# Patient Record
Sex: MALE | Race: OTHER
[De-identification: names, ages, dates, MRNs, and addresses within clinical notes are randomized per-mention and may not be internally consistent; named-entity substitution may affect disease eponyms.]

---

## 2020-06-09 ENCOUNTER — HOSPITAL ENCOUNTER (INPATIENT)
Dept: HOSPITAL 72 - EMR | Age: 60
LOS: 1 days | Discharge: HOME | DRG: 760 | End: 2020-06-10
Payer: COMMERCIAL

## 2020-06-09 VITALS — DIASTOLIC BLOOD PRESSURE: 92 MMHG | SYSTOLIC BLOOD PRESSURE: 140 MMHG

## 2020-06-09 VITALS — SYSTOLIC BLOOD PRESSURE: 140 MMHG | DIASTOLIC BLOOD PRESSURE: 92 MMHG

## 2020-06-09 VITALS — WEIGHT: 121 LBS | BODY MASS INDEX: 18.99 KG/M2 | HEIGHT: 67 IN

## 2020-06-09 VITALS — DIASTOLIC BLOOD PRESSURE: 85 MMHG | SYSTOLIC BLOOD PRESSURE: 130 MMHG

## 2020-06-09 DIAGNOSIS — G89.29: ICD-10-CM

## 2020-06-09 DIAGNOSIS — Z88.1: ICD-10-CM

## 2020-06-09 DIAGNOSIS — F32.9: ICD-10-CM

## 2020-06-09 DIAGNOSIS — G93.40: ICD-10-CM

## 2020-06-09 DIAGNOSIS — F19.11: ICD-10-CM

## 2020-06-09 DIAGNOSIS — F10.11: ICD-10-CM

## 2020-06-09 DIAGNOSIS — R45.1: Primary | ICD-10-CM

## 2020-06-09 DIAGNOSIS — M54.9: ICD-10-CM

## 2020-06-09 DIAGNOSIS — R41.0: ICD-10-CM

## 2020-06-09 LAB
ADD MANUAL DIFF: NO
ALBUMIN SERPL-MCNC: 3.8 G/DL (ref 3.4–5)
ALBUMIN/GLOB SERPL: 0.9 {RATIO} (ref 1–2.7)
ALP SERPL-CCNC: 126 U/L (ref 46–116)
ALT SERPL-CCNC: 20 U/L (ref 12–78)
ANION GAP SERPL CALC-SCNC: 12 MMOL/L (ref 5–15)
APPEARANCE UR: CLEAR
APTT PPP: YELLOW S
AST SERPL-CCNC: 23 U/L (ref 15–37)
BASOPHILS NFR BLD AUTO: 2.6 % (ref 0–2)
BILIRUB SERPL-MCNC: 0.3 MG/DL (ref 0.2–1)
BUN SERPL-MCNC: 19 MG/DL (ref 7–18)
CALCIUM SERPL-MCNC: 9.1 MG/DL (ref 8.5–10.1)
CHLORIDE SERPL-SCNC: 107 MMOL/L (ref 98–107)
CO2 SERPL-SCNC: 24 MMOL/L (ref 21–32)
CREAT SERPL-MCNC: 1.2 MG/DL (ref 0.55–1.3)
EOSINOPHIL NFR BLD AUTO: 2.9 % (ref 0–3)
ERYTHROCYTE [DISTWIDTH] IN BLOOD BY AUTOMATED COUNT: 13.3 % (ref 11.6–14.8)
GLOBULIN SER-MCNC: 4.1 G/DL
GLUCOSE UR STRIP-MCNC: NEGATIVE MG/DL
HCT VFR BLD CALC: 42.1 % (ref 42–52)
HGB BLD-MCNC: 13 G/DL (ref 14.2–18)
KETONES UR QL STRIP: NEGATIVE
LEUKOCYTE ESTERASE UR QL STRIP: NEGATIVE
LYMPHOCYTES NFR BLD AUTO: 36.2 % (ref 20–45)
MCV RBC AUTO: 94 FL (ref 80–99)
MONOCYTES NFR BLD AUTO: 9.9 % (ref 1–10)
NEUTROPHILS NFR BLD AUTO: 48.4 % (ref 45–75)
NITRITE UR QL STRIP: NEGATIVE
PH UR STRIP: 5 [PH] (ref 4.5–8)
PLATELET # BLD: 180 K/UL (ref 150–450)
POTASSIUM SERPL-SCNC: 4.1 MMOL/L (ref 3.5–5.1)
PROT UR QL STRIP: (no result)
RBC # BLD AUTO: 4.46 M/UL (ref 4.7–6.1)
SODIUM SERPL-SCNC: 143 MMOL/L (ref 136–145)
SP GR UR STRIP: 1.02 (ref 1–1.03)
UROBILINOGEN UR-MCNC: NORMAL MG/DL (ref 0–1)
WBC # BLD AUTO: 7.6 K/UL (ref 4.8–10.8)

## 2020-06-09 PROCEDURE — 81003 URINALYSIS AUTO W/O SCOPE: CPT

## 2020-06-09 PROCEDURE — 71045 X-RAY EXAM CHEST 1 VIEW: CPT

## 2020-06-09 PROCEDURE — 85025 COMPLETE CBC W/AUTO DIFF WBC: CPT

## 2020-06-09 PROCEDURE — 84484 ASSAY OF TROPONIN QUANT: CPT

## 2020-06-09 PROCEDURE — 36415 COLL VENOUS BLD VENIPUNCTURE: CPT

## 2020-06-09 PROCEDURE — 80053 COMPREHEN METABOLIC PANEL: CPT

## 2020-06-09 PROCEDURE — 80048 BASIC METABOLIC PNL TOTAL CA: CPT

## 2020-06-09 PROCEDURE — 99285 EMERGENCY DEPT VISIT HI MDM: CPT

## 2020-06-09 PROCEDURE — 80307 DRUG TEST PRSMV CHEM ANLYZR: CPT

## 2020-06-09 NOTE — DIAGNOSTIC IMAGING REPORT
Indication: Chest pain

 

Technique: One view of the chest

 

Comparison: none

 

Findings: Lungs and pleural spaces are clear. The heart size is normal.

 

Impression: No acute process

## 2020-06-09 NOTE — NUR
ED Nurse Note:



Spoke with Mary at [facility], states she will fax pt paperwork.

## 2020-06-09 NOTE — NUR
ED Nurse Note:



Report received from LESLIE Francois. Pt is resting bed, NAD. Safety precuations in 
place, RN bedside.

## 2020-06-09 NOTE — NUR
ED Nurse Note:



Pt is stable for transfer to MS unit at this time per ERMD. Pt is aaox4, no 
respiratory distress and is ambulatory with steady gait. Pt vitals are stable. 
Pt taken to unit via wheelchair by RN. Pt belongings taken out of locker and 
transferred up to unit with pt. Pt IV is patent and intact. Physical copy of 
5150 hold handed to charge RN on unit.
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
## 2020-06-09 NOTE — NUR
ED Nurse Note:



Pt began yelling inappropriate language on the unit and is anxious at this 
time. Pt reminded that behavior is not appropriate. Will continue to monitor. 
RN bedside, safety measures in place.
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
## 2020-06-09 NOTE — NUR
ED Nurse Note:



Pt is being uncooperative at this time and yelling in room. CRE/VRE/MRSA swabs 
not obtained. Endorsed to receiving nurse.

## 2020-06-09 NOTE — NUR
ED Nurse Note:



Patient is in the room, calm and cooperative. AAO x4, VSS at this time, NAD 
noted.

## 2020-06-10 ENCOUNTER — HOSPITAL ENCOUNTER (INPATIENT)
Dept: HOSPITAL 72 - EMR | Age: 60
LOS: 2 days | Discharge: HOME | DRG: 52 | End: 2020-06-12
Payer: COMMERCIAL

## 2020-06-10 VITALS — WEIGHT: 147 LBS | HEIGHT: 67 IN | BODY MASS INDEX: 23.07 KG/M2

## 2020-06-10 VITALS — SYSTOLIC BLOOD PRESSURE: 105 MMHG | DIASTOLIC BLOOD PRESSURE: 69 MMHG

## 2020-06-10 VITALS — DIASTOLIC BLOOD PRESSURE: 75 MMHG | SYSTOLIC BLOOD PRESSURE: 108 MMHG

## 2020-06-10 VITALS — SYSTOLIC BLOOD PRESSURE: 129 MMHG | DIASTOLIC BLOOD PRESSURE: 89 MMHG

## 2020-06-10 VITALS — DIASTOLIC BLOOD PRESSURE: 70 MMHG | SYSTOLIC BLOOD PRESSURE: 109 MMHG

## 2020-06-10 VITALS — DIASTOLIC BLOOD PRESSURE: 76 MMHG | SYSTOLIC BLOOD PRESSURE: 119 MMHG

## 2020-06-10 DIAGNOSIS — Z99.3: ICD-10-CM

## 2020-06-10 DIAGNOSIS — D64.9: ICD-10-CM

## 2020-06-10 DIAGNOSIS — M54.5: ICD-10-CM

## 2020-06-10 DIAGNOSIS — M54.9: ICD-10-CM

## 2020-06-10 DIAGNOSIS — R45.1: ICD-10-CM

## 2020-06-10 DIAGNOSIS — G93.40: Primary | ICD-10-CM

## 2020-06-10 DIAGNOSIS — G89.29: ICD-10-CM

## 2020-06-10 LAB
ADD MANUAL DIFF: NO
ANION GAP SERPL CALC-SCNC: 6 MMOL/L (ref 5–15)
BASOPHILS NFR BLD AUTO: 1.3 % (ref 0–2)
BUN SERPL-MCNC: 19 MG/DL (ref 7–18)
CALCIUM SERPL-MCNC: 8.5 MG/DL (ref 8.5–10.1)
CHLORIDE SERPL-SCNC: 109 MMOL/L (ref 98–107)
CO2 SERPL-SCNC: 29 MMOL/L (ref 21–32)
CREAT SERPL-MCNC: 1.2 MG/DL (ref 0.55–1.3)
EOSINOPHIL NFR BLD AUTO: 4.7 % (ref 0–3)
ERYTHROCYTE [DISTWIDTH] IN BLOOD BY AUTOMATED COUNT: 11.8 % (ref 11.6–14.8)
HCT VFR BLD CALC: 36.4 % (ref 42–52)
HGB BLD-MCNC: 12.3 G/DL (ref 14.2–18)
LYMPHOCYTES NFR BLD AUTO: 38.3 % (ref 20–45)
MCV RBC AUTO: 87 FL (ref 80–99)
MONOCYTES NFR BLD AUTO: 11.5 % (ref 1–10)
NEUTROPHILS NFR BLD AUTO: 44.2 % (ref 45–75)
PLATELET # BLD: 155 K/UL (ref 150–450)
POTASSIUM SERPL-SCNC: 4 MMOL/L (ref 3.5–5.1)
RBC # BLD AUTO: 4.16 M/UL (ref 4.7–6.1)
SODIUM SERPL-SCNC: 144 MMOL/L (ref 136–145)
WBC # BLD AUTO: 5.2 K/UL (ref 4.8–10.8)

## 2020-06-10 PROCEDURE — 36415 COLL VENOUS BLD VENIPUNCTURE: CPT

## 2020-06-10 PROCEDURE — 80048 BASIC METABOLIC PNL TOTAL CA: CPT

## 2020-06-10 PROCEDURE — 94640 AIRWAY INHALATION TREATMENT: CPT

## 2020-06-10 PROCEDURE — 85025 COMPLETE CBC W/AUTO DIFF WBC: CPT

## 2020-06-10 PROCEDURE — 99284 EMERGENCY DEPT VISIT MOD MDM: CPT

## 2020-06-10 NOTE — NUR
NURSE NOTES:

Patient is discharged to [facility] without any signs of distress. Patient is 
alert and awake. A&O X3. Respiration is even and unlabored on room air. denies any pain. 
Belongings checked; inventory paper signed by patient. Patient left with no home 
medication. Skin is clear and intact. Patient is provided with after-care instructions, 
provided medication teaching, and to follow-up with any MD's appointment; patient verbalized 
understanding of after-care instructions. IV site and wrist band removed. Patient is placed 
in a w/c, escorted to the lobby, and transported via [employer].

## 2020-06-10 NOTE — NUR
*-* INSURANCE *-*



ALL CLINICALS AND REVIEWS HAVE BEEN FAXED TO:



[facility] 

Ref# [id number]

#[phone]

fax# [phone]

## 2020-06-10 NOTE — NUR
ED Nurse Note:

Patient transported to floor without incident. Belongings sheet completed prior 
to transport, patient had $60.00 upon departure 3x$20.00.

## 2020-06-10 NOTE — NUR
CASE MANAGEMENT: INITIAL REVIEW



60YR OLD MALE BIBA FROM [facility] 



CC:BEHAVIOR COMPLAINT 



SI:ENCEPHALOPATHY . CHRONIC BACK PAIN 

PMH: HIV 

97.6   90   16   140/92   99% ON RA

BUN 19  ALKP 126  URINE OPIATES +    URINE BENZO +    ALCOHOL <3  SALICYLATE 1.0



IS:KEPPRA PO BID

WELLBUTRIN SR PO BID

NORCO Q6HR/PRN



\**: 4E MED SURG UNIT 



PLAN:

PSYC EVAL 







CASE MANAGEMENT:  REVIEW



06/10/20



SI:ENCEPHALOPATHY . CHRONIC BACK PAIN 

98.1   73   17   108/75   96% ON RA

BUN 19  



IS:KEPPRA PO BID

WELLBUTRIN SR PO BID

NORCO Q6HR/PRN



\**: 4E MED SURG UNIT 

DCP: PLACEMENT NEEDED

## 2020-06-10 NOTE — NUR
*-* INSURANCE *-*



UPDATED CLINICALS AND REVIEWS HAVE BEEN FAXED TO:



[facility]

REF# [id number]

F: [phone]

## 2020-06-10 NOTE — NUR
NOTE



Dr. Gonsales ordered this pt to transfer psych unit: [facility], [facility] or [place]. 



SW spoke w/ MAGDIEL from [facility] and [place] [facility] [phone] that there is no male bed 
available today and declined this SW to fax the referral packet. 



SW spoke w/ Sanam from [facility] [phone] that there is no bed available for wheelchair 
bound. 


-------------------------------------------------------------------------------

Addendum: 06/10/20 at 1302 by RAFITA OWENS SW

-------------------------------------------------------------------------------

SW was informed by CM that pt is not welcome to return [facility]. Pt does not have any 
bed hold. 



CHILANGO informed pt that he is not welcome to return. Pt was frustrated with such news. Pt 
reports his belongings,. wheelchair and money is at [facility] that he has to return 
there. 



CHILANGO offered board and care and independent living facilities but pt declined and stated that 
he has section8 voucher. CHILANGO explained pt that this SW cannot assist pt w/ finding section 8 
apartment but can only provide resource on section 8 apartments.Pt verbalized understanding. 
Pt wants to talk to his counselor, Claire at [facility] and discuss DC planning.

## 2020-06-10 NOTE — NUR
ED Nurse Note:

Patient here for head and back pain, non-ambulatory, wheel chair bound could 
not be cared for at nursing facility he was at. Will continue to monitor.

## 2020-06-10 NOTE — NUR
NURSE NOTES:

Patient was seen by Dr. Bello @ the bedside and Dr. Bello cleared the patient for 
discharge. Dr. Gonsales was paged. Awaiting for Dr. Gonsales to call back.

## 2020-06-10 NOTE — NUR
NURSE NOTES:

pt in the bed awake and eating breakfast. No SOB noted. Denies any pain. Left hand IV line 
saline lock inplace. No acute distress noted at this time. pt is calm and quiet. call light 
is within reach, will follow plan of care.

## 2020-06-10 NOTE — HISTORY AND PHYSICAL REPORT
DATE OF ADMISSION:  06/09/2020

DATE AND TIME SEEN:  06/10/2020 at 9 a.m.



CONSULTANT:  Sean Bello M.D.



CHIEF COMPLAINT:  Encephalopathy, confusion, agitation.



BRIEF HISTORY:  This is a 60-year-old male from [facility],

presented with above-mentioned diagnosis, was sent to [facility], diagnosed

with the above, admitted to medical floor.  Currently, slightly anxious in

bed, no complaint.



REVIEW OF SYSTEMS:  No chest pain.  No shortness of breath.  No nausea,

vomiting, or diarrhea.



PAST MEDICAL HISTORY:  Includes encephalopathy and chronic back

pain.



PAST SURGICAL HISTORY:  Appendectomy.



ALLERGIES:  Ceftriaxone.



MEDICATIONS:  Include Wellbutrin, Prezista, Valium, Truvada, Keppra,

Norvir, Dulcolax, Colace, magnesium.



SOCIAL HISTORY:  No smoking.  Positive alcohol.  No intravenous drug

abuse.



FAMILY HISTORY:  Noncontributory.



PHYSICAL EXAMINATION:

GENERAL:  Slightly anxious in bed, oriented x3, in no acute

distress.

VITAL SIGNS:  Temperature is 97 degrees, pulse 80, respirations 17, blood

pressure 105/69.

CARDIOVASCULAR:  No murmur.

LUNGS:  Distant and clear.

ABDOMEN:  Bowel sounds positive.  Nontender.  Nondistended.

EXTREMITIES:  No cyanosis, clubbing, or edema.

NEUROLOGIC:  The patient moves all extremities, slightly weak.



LABORATORY AND DIAGNOSTIC DATA:  Labs at this time show hemoglobin and

hematocrit of 12/36, otherwise CBC is normal.  BMP showed chloride 109,

BUN 19.  Troponin 0.00.  Alkaline phosphatase 126.  Urinalysis - 2+

protein, otherwise normal.  Urine tox is positive for benzodiazepine and

opiates.



ASSESSMENT:

1. Encephalopathy.

2. Confusion.

3. Agitation.

4. Anemia.



PLAN:

1. Psych and dietary followup.

2. Resume home medications.

3. Pain control.

4. Transfer to Psychiatry.









  ______________________________________________

  Venkat Gonsales D.O.





DR:  ANITRA

D:  06/10/2020 09:18

T:  06/10/2020 13:05

JOB#:  [id number]

CC:

## 2020-06-10 NOTE — NUR
DISCHARGE PLANNED



CHILANGO informed pt again that he is not welcome to return [facility]. CHILANGO offered board and 
cares and independent living facilities. Pt requested to be referred to independent living 
facility. Pt agreed to pay for the housing. SW explained negative ramification of unlicensed 
facilities. Pt verbalized understanding. 



CHILANGO spoke w/ the manager, Chinmay from [facility] [phone] and accepted pt at [address] 
[place] [address]. 



CHILANGO provide such address and contact information to pt. PT is willing to go to [facility]. 
Pt will be transported via taxi. CHILANGO informed the charge RN. 



CHILANGO spoke w/ Radha from [facility] Business [employer] that they will issue a check. CHILANGO 
also spoke w/ Letty from [facility] that his belongings will be dropped off at his new 
facility. CHILANGO relayed all information to pt. 




-------------------------------------------------------------------------------

Addendum: 06/10/20 at 1657 by RAFITA KEE

-------------------------------------------------------------------------------



CHILANGO reviewed 5150 hold to confirm if notification has to be sent to [employer] Officer Jakub 
[phone] but the call did not go through. 



CHILANGO spoke w/ Officer Michael Osborn from [facility] [phone] that 
notification/long term is not needed.
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
## 2020-06-11 VITALS — DIASTOLIC BLOOD PRESSURE: 75 MMHG | SYSTOLIC BLOOD PRESSURE: 118 MMHG

## 2020-06-11 VITALS — DIASTOLIC BLOOD PRESSURE: 86 MMHG | SYSTOLIC BLOOD PRESSURE: 109 MMHG

## 2020-06-11 VITALS — DIASTOLIC BLOOD PRESSURE: 73 MMHG | SYSTOLIC BLOOD PRESSURE: 117 MMHG

## 2020-06-11 VITALS — DIASTOLIC BLOOD PRESSURE: 76 MMHG | SYSTOLIC BLOOD PRESSURE: 129 MMHG

## 2020-06-11 VITALS — DIASTOLIC BLOOD PRESSURE: 68 MMHG | SYSTOLIC BLOOD PRESSURE: 109 MMHG

## 2020-06-11 VITALS — DIASTOLIC BLOOD PRESSURE: 71 MMHG | SYSTOLIC BLOOD PRESSURE: 121 MMHG

## 2020-06-11 LAB
ADD MANUAL DIFF: NO
ANION GAP SERPL CALC-SCNC: 10 MMOL/L (ref 5–15)
BASOPHILS NFR BLD AUTO: 1.1 % (ref 0–2)
BUN SERPL-MCNC: 18 MG/DL (ref 7–18)
CALCIUM SERPL-MCNC: 8.8 MG/DL (ref 8.5–10.1)
CHLORIDE SERPL-SCNC: 106 MMOL/L (ref 98–107)
CO2 SERPL-SCNC: 26 MMOL/L (ref 21–32)
CREAT SERPL-MCNC: 1 MG/DL (ref 0.55–1.3)
EOSINOPHIL NFR BLD AUTO: 1.8 % (ref 0–3)
ERYTHROCYTE [DISTWIDTH] IN BLOOD BY AUTOMATED COUNT: 11.5 % (ref 11.6–14.8)
HCT VFR BLD CALC: 37.9 % (ref 42–52)
HGB BLD-MCNC: 13.4 G/DL (ref 14.2–18)
LYMPHOCYTES NFR BLD AUTO: 31.6 % (ref 20–45)
MCV RBC AUTO: 86 FL (ref 80–99)
MONOCYTES NFR BLD AUTO: 9.5 % (ref 1–10)
NEUTROPHILS NFR BLD AUTO: 55.9 % (ref 45–75)
PLATELET # BLD: 168 K/UL (ref 150–450)
POTASSIUM SERPL-SCNC: 4.3 MMOL/L (ref 3.5–5.1)
RBC # BLD AUTO: 4.39 M/UL (ref 4.7–6.1)
SODIUM SERPL-SCNC: 141 MMOL/L (ref 136–145)
WBC # BLD AUTO: 5.9 K/UL (ref 4.8–10.8)

## 2020-06-11 RX ADMIN — IPRATROPIUM BROMIDE AND ALBUTEROL SULFATE SCH ML: .5; 3 SOLUTION RESPIRATORY (INHALATION) at 15:46

## 2020-06-11 RX ADMIN — HYDROCODONE BITARTRATE AND ACETAMINOPHEN PRN TAB: 5; 325 TABLET ORAL at 17:28

## 2020-06-11 RX ADMIN — DOCUSATE SODIUM SCH MG: 100 CAPSULE, LIQUID FILLED ORAL at 09:37

## 2020-06-11 RX ADMIN — IPRATROPIUM BROMIDE AND ALBUTEROL SULFATE SCH ML: .5; 3 SOLUTION RESPIRATORY (INHALATION) at 08:09

## 2020-06-11 RX ADMIN — HYDROCODONE BITARTRATE AND ACETAMINOPHEN PRN TAB: 5; 325 TABLET ORAL at 11:46

## 2020-06-11 RX ADMIN — IPRATROPIUM BROMIDE AND ALBUTEROL SULFATE SCH ML: .5; 3 SOLUTION RESPIRATORY (INHALATION) at 19:09

## 2020-06-11 RX ADMIN — HYDROCODONE BITARTRATE AND ACETAMINOPHEN PRN TAB: 5; 325 TABLET ORAL at 05:04

## 2020-06-11 RX ADMIN — CALCIUM CARBONATE-VITAMIN D TAB 500 MG-200 UNIT SCH TAB: 500-200 TAB at 09:38

## 2020-06-11 NOTE — NUR
CASE MANAGEMENT: INITIAL REVIEW



60YR OLD MALE BIBA FROM [facility] 



CC:ENCEPHALOPATHY 



SI:ENCEPHALOPATHY

98.5   89   18   129/89   99% ON RA





IS: ALBUTEROL  HHN Q6HR

NORCO Q6HR/PRN

OSCAL PO QD

KEPPRA PO BID



\**: 3E MED SURG UNIT 

DCP:

PT EVAL 

PLACEMENT NEEDED

ANTICIPATED DC IN AM PER SW

## 2020-06-11 NOTE — HISTORY AND PHYSICAL REPORT
DATE OF ADMISSION:  06/10/2020

TIME SEEN:  Approximate time is 2 p.m.



CONSULTING PHYSICIAN:  Venkat Gonsales D.O.



REFERRING PHYSICIAN:  Sean Bello M.D.



CHIEF COMPLAINT:  Encephalopathy, confusion, and agitation.



HISTORY OF PRESENT ILLNESS:  This is a 60-year-old male from [facility] with above-mentioned diagnoses.  Yesterday he was admitted to

[facility] for above.  The patient was sent back to nursing home

_____ agitation, currently sleeping in bed, not talking much.



REVIEW OF SYSTEMS:  Unavailable.



PAST MEDICAL HISTORY:  Includes chronic back pain and

encephalopathy.



PAST SURGICAL HISTORY:  Appendectomy.



MEDICATIONS:  Include Xalatan, Wellbutrin, Os-David, Prezista, Truvada,

Keppra, Norvir, ibuprofen, Tylenol, bisacodyl, and hydrocodone.



ALLERGIES:  Rocephin.



SOCIAL HISTORY:  No smoking.  Positive alcohol.  No intravenous drug

abuse.



FAMILY HISTORY:  Noncontributory.



PHYSICAL EXAMINATION:

GENERAL:  Calm, sleeping in bed, not talking much.

VITAL SIGNS:  Temperature is 98 degrees, pulse 67, respirations 17, blood

pressure 120/71.

CARDIOVASCULAR:  No murmur.

LUNGS:  Distant and clear.

ABDOMEN:  Bowel sound positive.  Nontender.  Nondistended.

EXTREMITIES:  No cyanosis, clubbing, or edema.

NEUROLOGIC:  The patient moves all extremities, slightly weak.



LABORATORY DATA:  Hemoglobin and hematocrit 13/37, otherwise normal.  BMP

is normal.



ASSESSMENT:

1. Encephalopathy.

2. Confusion.

3. Agitation.

4. Anemia.

5. Chronic back pain.



PLAN:

1. Pain control.

2. PT and dietary evaluation.

3. CBC and BMP morning.

4. Psych transfer versus discharge planning to skilled nursing

facility.









  ______________________________________________

  Venkat Gonsales D.O.





DR:  Manuela

D:  06/11/2020 15:00

T:  06/11/2020 21:47

JOB#:  [id number]

CC:

## 2020-06-11 NOTE — NUR
Received pt from ED via wheelchair, alert, awake, and oriented x 4, c/o  headache 5/10 on 
pain scale. Pt denies blurred vision, no nausea or vomiting. No IV access noted. Will admit 
pt to floor. Will continue to monitor.

## 2020-06-11 NOTE — NUR
NURSE NOTES:

pt in the bed awake. no SOB noted. denies any pain and discomfort. no acute distress noted 
at this time. call light is within reach, will continue to follow plan of care.

## 2020-06-11 NOTE — NUR
NOTE



SW met w/ pt and clarified his recent DC. PT did not have DME with him when he got there. Pt 
reports he can use crutch but none was provided. Pt can perform all ADLs and IADLs 
independently. 



[facility] does not have a bed for wheelchair bound at this time.



CHILANGO spoke w/ Claire from [facility] that his wheelchair is still there. Will drop off 
his wheelchair at 11am today. 



Claire informed this SW that they can drop other belongings to his new housing. 



CHILANGO spoke jasper/ Malia [phone] and is willing to accept pt for housing as long as she 
receives pt's check from [facility]. 



CHILANGO assisted pt communicating ioana Finley and pt agreed to work wSheila Finley. Pt wrote the 
statement giving permission to [facility]ia receiving his check.



CHILANGO faxed the statement to Radha, [facility] [employer] [phone] Per 
Radha, the check will be available on the next day afternoon. CHILANGO relayed all information 
to KRISTA Puckett. 



Once Malia receives the check, she can provide transportation to pt's new housing. 




-------------------------------------------------------------------------------

Addendum: 06/11/20 at 1318 by RAFITA KEE

-------------------------------------------------------------------------------

CHILANGO confirmed ioana Finley that she will pick him up tomorrow between 1pm-2pm. Awaiting to get 
the address from Malia
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
## 2020-06-11 NOTE — NUR
NURSE NOTES:

Patient in bed, was sleeping, alert and verbally responsive. Able to make needs known. Kept 
clean and comfortable. Provided safe environment. Bed in low and locked position. No s/s of 
pain or discomfort noted. Respiration is even and unlabored. Skin is warm and dry to touch. 
No iv site noted. Call light is at bedside. Will continue plan of care.

## 2020-06-11 NOTE — NUR
CASE MANAGEMENT:  REVIEW



06/11/20 



SI:ENCEPHALOPATHY

97.2   70   17   109/86   96% ON RA





IS: ALBUTEROL  HHN Q6HR

NORCO Q6HR/PRN

OSCAL PO QD

KEPPRA PO BID



\**: 3E MED SURG UNIT 

DCP:

PT EVAL 

PLACEMENT NEEDED

ANTICIPATED DC IN AM PER SW

## 2020-06-12 VITALS — DIASTOLIC BLOOD PRESSURE: 68 MMHG | SYSTOLIC BLOOD PRESSURE: 105 MMHG

## 2020-06-12 VITALS — DIASTOLIC BLOOD PRESSURE: 63 MMHG | SYSTOLIC BLOOD PRESSURE: 112 MMHG

## 2020-06-12 VITALS — SYSTOLIC BLOOD PRESSURE: 125 MMHG | DIASTOLIC BLOOD PRESSURE: 70 MMHG

## 2020-06-12 VITALS — SYSTOLIC BLOOD PRESSURE: 118 MMHG | DIASTOLIC BLOOD PRESSURE: 75 MMHG

## 2020-06-12 LAB
ADD MANUAL DIFF: NO
ANION GAP SERPL CALC-SCNC: 10 MMOL/L (ref 5–15)
BASOPHILS NFR BLD AUTO: 1 % (ref 0–2)
BUN SERPL-MCNC: 24 MG/DL (ref 7–18)
CALCIUM SERPL-MCNC: 8.7 MG/DL (ref 8.5–10.1)
CHLORIDE SERPL-SCNC: 106 MMOL/L (ref 98–107)
CO2 SERPL-SCNC: 25 MMOL/L (ref 21–32)
CREAT SERPL-MCNC: 1.1 MG/DL (ref 0.55–1.3)
EOSINOPHIL NFR BLD AUTO: 2 % (ref 0–3)
ERYTHROCYTE [DISTWIDTH] IN BLOOD BY AUTOMATED COUNT: 11.1 % (ref 11.6–14.8)
HCT VFR BLD CALC: 40.5 % (ref 42–52)
HGB BLD-MCNC: 13.1 G/DL (ref 14.2–18)
LYMPHOCYTES NFR BLD AUTO: 35.7 % (ref 20–45)
MCV RBC AUTO: 93 FL (ref 80–99)
MONOCYTES NFR BLD AUTO: 7.8 % (ref 1–10)
NEUTROPHILS NFR BLD AUTO: 53.5 % (ref 45–75)
PLATELET # BLD: 149 K/UL (ref 150–450)
POTASSIUM SERPL-SCNC: 4.1 MMOL/L (ref 3.5–5.1)
RBC # BLD AUTO: 4.35 M/UL (ref 4.7–6.1)
SODIUM SERPL-SCNC: 141 MMOL/L (ref 136–145)
WBC # BLD AUTO: 6.7 K/UL (ref 4.8–10.8)

## 2020-06-12 RX ADMIN — IPRATROPIUM BROMIDE AND ALBUTEROL SULFATE SCH ML: .5; 3 SOLUTION RESPIRATORY (INHALATION) at 00:54

## 2020-06-12 RX ADMIN — DOCUSATE SODIUM SCH MG: 100 CAPSULE, LIQUID FILLED ORAL at 09:00

## 2020-06-12 RX ADMIN — HYDROCODONE BITARTRATE AND ACETAMINOPHEN PRN TAB: 5; 325 TABLET ORAL at 07:30

## 2020-06-12 RX ADMIN — IPRATROPIUM BROMIDE AND ALBUTEROL SULFATE SCH ML: .5; 3 SOLUTION RESPIRATORY (INHALATION) at 07:00

## 2020-06-12 RX ADMIN — IPRATROPIUM BROMIDE AND ALBUTEROL SULFATE SCH ML: .5; 3 SOLUTION RESPIRATORY (INHALATION) at 13:00

## 2020-06-12 RX ADMIN — HYDROCODONE BITARTRATE AND ACETAMINOPHEN PRN TAB: 5; 325 TABLET ORAL at 13:28

## 2020-06-12 RX ADMIN — CALCIUM CARBONATE-VITAMIN D TAB 500 MG-200 UNIT SCH TAB: 500-200 TAB at 09:47

## 2020-06-12 RX ADMIN — HYDROCODONE BITARTRATE AND ACETAMINOPHEN PRN TAB: 5; 325 TABLET ORAL at 00:31
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
## 2020-06-12 NOTE — NUR
PT NOTE

Received MD order for PT evaluation. Attempted to see patient however patient very agitated 
at this time, yelling at staff. Patient observed to stand from his wheelchair and take a few 
steps to the bathroom. Patt NELSON notified, will follow.

## 2020-06-12 NOTE — NUR
NURSE NOTES:

Patient discharged to [facility]. Discharge protocol followed.

## 2020-06-12 NOTE — NUR
NURSE NOTES:

Received report from LESLIE Bran. Patient A&xO4. On room air, no signs of distress or labored 
breathing. No IV access, MD aware. Bed in lowest position with call light in reach. Will 
continue with plan of care.

## 2020-06-12 NOTE — NUR
NURSE NOTES:

Patient discharged to [facility]. Discharge protocol followed.

## 2020-06-12 NOTE — GENERAL PROGRESS NOTE
Assessment/Plan


Problem List:  


(1) Pain


ICD Codes:  R52 - Pain, unspecified


SNOMED:  00468300


(2) Encephalopathy


ICD Codes:  G93.40 - Encephalopathy, unspecified


SNOMED:  57078561


Status:  unchanged


Assessment/Plan:


pain control psc tx dc if clear





Subjective


Constitutional:  Reports: weakness


Allergies:  


Coded Allergies:  


     CEFTRIAXONE (Verified  Allergy, Unknown, 6/9/20)


All Systems:  reviewed and negative except above


Subjective


sl anxious





Objective





Last 24 Hour Vital Signs








  Date Time  Temp Pulse Resp B/P (MAP) Pulse Ox O2 Delivery O2 Flow Rate FiO2


 


6/12/20 08:00 97.0 79 18 125/70 (88) 98   


 


6/12/20 04:00 98.2 74 18 112/63 (79) 96   


 


6/12/20 00:00 98.0 66 18 105/68 (80) 97   


 


6/11/20 20:27      Room Air  


 


6/11/20 20:00 97.9 71 16 109/68 (82) 98   


 


6/11/20 16:00 98.4 69 18 129/76 (93) 98   


 


6/11/20 15:46  72 20  99 Room Air  21





  69 20  97   


 


6/11/20 12:00 98.1 67 17 121/71 (88) 98   

















Intake and Output  


 


 6/11/20 6/12/20





 19:00 07:00


 


Intake Total 360 ml 600 ml


 


Output Total 300 ml 1000 ml


 


Balance 60 ml -400 ml


 


  


 


Intake Oral 360 ml 600 ml


 


Output Urine Total 300 ml 1000 ml








Laboratory Tests


6/12/20 05:35: 


White Blood Count 6.7, Red Blood Count 4.35L, Hemoglobin 13.1L, Hematocrit 40.5L

, Mean Corpuscular Volume 93#, Mean Corpuscular Hemoglobin 30.1, Mean 

Corpuscular Hemoglobin Concent 32.4, Red Cell Distribution Width 11.1L, 

Platelet Count 149L, Mean Platelet Volume 7.9, Neutrophils (%) (Auto) 53.5, 

Lymphocytes (%) (Auto) 35.7, Monocytes (%) (Auto) 7.8, Eosinophils (%) (Auto) 

2.0, Basophils (%) (Auto) 1.0, Sodium Level 141, Potassium Level 4.1, Chloride 

Level 106, Carbon Dioxide Level 25, Anion Gap 10, Blood Urea Nitrogen 24H, 

Creatinine 1.1, Estimat Glomerular Filtration Rate > 60, Glucose Level 122H, 

Calcium Level 8.7


Height (Feet):  5


Height (Inches):  7.00


Weight (Pounds):  147


General Appearance:  lethargic


EENT:  normal ENT inspection


Neck:  normal alignment


Cardiovascular:  normal peripheral pulses, normal rate, regular rhythm


Respiratory/Chest:  chest wall non-tender, lungs clear, normal breath sounds


Abdomen:  normal bowel sounds, non tender, soft


Extremities:  normal inspection


Edema:  no edema noted Arm (L), no edema noted Arm (R), no edema noted Leg (L), 

no edema noted Leg (R), no edema noted Pedal (L), no edema noted Pedal (R), no 

edema noted Generalized


Neurologic:  motor weakness


Skin:  normal pigmentation, warm/dry











Venkat Gonsales  Jun 12, 2020 09:36

## 2020-06-12 NOTE — NUR
NOTE



CHILANGO informed pt that Malia will p/u at 1pm-2pm. Pt reports he does not want to leave today. 
CHILANGO explained DC procedure but pt became irritable. CHILANGO relayed all information to Charge 
Nurse and assigned CM. 




-------------------------------------------------------------------------------

Addendum: 06/12/20 at 0936 by RAFITA KEE

-------------------------------------------------------------------------------

CHILANGO requested Malia to provide the address. Awaiting for response. This SW did not receive 
the address yesterday. 


-------------------------------------------------------------------------------

Addendum: 06/12/20 at 0953 by RAFITA KEE

-------------------------------------------------------------------------------

Per Malia, pt is accepted at [facility] [address] [place] [address]. 


-------------------------------------------------------------------------------

Addendum: 06/12/20 at 1007 by RAFITA KEE

-------------------------------------------------------------------------------

Charge nurse spoke w/ pt and confirmed pt is willing to go to the facility today.

## 2020-06-12 NOTE — NUR
*-* INSURANCE *-*



UPDATED CLINICALS AND REVIEWS HAVE BEEN FAXED TO:



[facility]

REF# [id number]

F: [phone]

## 2020-06-14 NOTE — DISCHARGE SUMMARY
Discharge Summary


Discharge Summary


_


DATE OF ADMISSION: 06/11/2020


DATE OF DISCHARGE: 06/12/2020





DISCHARGED BY: Dr. Venkat Gonsales





BRIEF HOSPITAL COURSE:


Patient is a 60-year-old male, who was just discharged from the hospital, was 

brought into ED from [facility] due to encephalopathy, confusion and 

agitation.  Patient was admitted to medical floor.  Prior medications 

continued.  Case management and  were called to aid with 

discharge.  Coordination was made and [facility]  





FINAL DIAGNOSES: 


Encephalopathy





DISPOSITION: Patient was discharged to a board-and-care





DISCHARGE MEDICATIONS: Refer to Discharge Medication List.





DISCHARGE INSTRUCTIONS: Follow-up in a week.








I have been assigned to complete a discharge summary on this account, I was not 

involved with the patient's management.--MYRIAM Purdy Jacqueline Robles NP Jun 14, 2020 15:50

## 2020-06-15 NOTE — EMERGENCY ROOM REPORT
History of Present Illness


General


Chief Complaint:  Pain


Source:  Patient





Present Illness


HPI


60-year-old male presents for evaluation.  Patient was admitted yesterday to 

hospital for psychiatric evaluation after he became combative at his skilled 

nursing facility.  Patient was subsequently cleared and discharged to an 

assisted living facility this morning.  However the facility stated they could 

not accommodate patients with a wheelchair.  Patient had nowhere else to go so 

he came here to the hospital.  Denies pain.  Denies SI or HI.  No other 

aggravating relieving factors.  Denies any other associated symptoms


Allergies:  


Coded Allergies:  


     CEFTRIAXONE (Verified  Allergy, Unknown, 6/9/20)





COVID-19 Screening


Contact w/high risk pt:  Yes


Recent Travel to affected area:  No


Experienced COVID-19 symptoms?:  No


COVID-19 Testing performed PTA:  No





Patient History


Past Medical History:  psych hx


Past Surgical History:  none


Pertinent Family History:  none


Social History:  Denies: smoking, alcohol use, drug use


Immunizations:  UTD


Reviewed Nursing Documentation:  PMH: Agreed; PSxH: Agreed





Nursing Documentation-PMH


Past Medical History:  No History, Except For


Hx Cardiac Problems:  No


Hx Cancer:  No


Hx Gastrointestinal Problems:  No


Hx Neurological Problems:  Yes


Hx Cerebrovascular Accident:  No


Hx Transient Ischemic Attacks:  No


Hx Dementia:  No


Hx Alzheimer's Disease:  No


Hx Parkinson's Disease:  No


Hx Meningitis:  No


Hx Encephalitis:  No


Hx Seizures:  No


Hx Epilepsy:  No


Hx Multiple Sclerosis:  No


Hx Cerebral Palsy:  No


Hx Amyotrophic Lat Sclerosis:  No


Hx Guillian-Lagrange Syndrome:  No


Hx Paralysis:  Yes


Hx Peripheral Neuropathy:  No


Hx Spinal Cord Injury:  No


Hx Head Trauma:  No


Hx Traumatic Brain Injury:  Yes


Hx Memory Loss:  No


Hx Concentration Difficulty:  No


Hx Speech Problem:  No


Hx Tremors:  No


Hx Vertigo:  No


Hx Dizziness:  No


Hx Syncope:  No


Hx Headaches:  Yes


Hx Aphasia:  No


Hx Dysphasia:  No


Hx Numbness:  No


Hx Weakness:  No


Hx Fatigue:  No


Hx Neurologic Surgery:  No


Hx Brain Shunt:  No





Review of Systems


All Other Systems:  negative except mentioned in HPI





Physical Exam





Vital Signs








  Date Time  Temp Pulse Resp B/P (MAP) Pulse Ox O2 Delivery O2 Flow Rate FiO2


 


6/11/20 08:00 97.2 70 17 109/86 (94) 96   


 


6/11/20 08:08      Room Air  21








Sp02 EP Interpretation:  reviewed, normal


General Appearance:  no apparent distress, alert, GCS 15, non-toxic


Head:  normocephalic, atraumatic


Eyes:  bilateral eye normal inspection, bilateral eye PERRL


ENT:  hearing grossly normal, normal pharynx, no angioedema, normal voice


Neck:  full range of motion, supple/symm/no masses


Respiratory:  chest non-tender, lungs clear, normal breath sounds, speaking 

full sentences


Cardiovascular #1:  regular rate, rhythm, no edema


Cardiovascular #2:  2+ carotid (R), 2+ carotid (L), 2+ radial (R), 2+ radial (L)

, 2+ dorsalis pedis (R), 2+ dorsalis pedis (L)


Gastrointestinal:  normal bowel sounds, non tender, soft, non-distended, no 

guarding, no rebound


Rectal:  deferred


Genitourinary:  normal inspection, no CVA tenderness


Musculoskeletal:  back normal, normal range of motion, gait/station normal, non-

tender


Neurologic:  alert, motor strength/tone normal, oriented x3, sensory intact, 

responsive, speech normal


Psychiatric:  judgement/insight normal, memory normal, mood/affect normal, no 

suicidal/homicidal ideation


Reflexes:  3+ bicep (R), 3+ bicep (L), 3+ tricep (R), 3+ tricep (L), 3+ knee (R)

, 3+ knee (L)


Skin:  other - see nursing skin notes


Lymphatic:  no adenopathy





Medical Decision Making


Diagnostic Impression:  


 Primary Impression:  


 Pain


 Additional Impression:  


 Encephalopathy


ER Course








60-year-old male presents for evaluation after he was not accepted at his 

assisted living facility due to wheelchair access.





I discussed with his PMD Dr. Gonsales.  He agreed to admit the patient so that 

they can seek additional housing placement options for the patient.





vitals stable.  Labs performed this morning and do not require to be repeated 

at this time.





Last Vital Signs








  Date Time  Temp Pulse Resp B/P (MAP) Pulse Ox O2 Delivery O2 Flow Rate FiO2


 


6/12/20 12:00 97.0 74 18 118/75 (89) 96   


 


6/12/20 09:00      Room Air  


 


6/11/20 15:46        21








Status:  improved


Disposition:  PLACE IN OBSERVATION


Condition:  Stable


Referrals:  


Venkat Gonsales DO (PCP)











Hi Davidson MD Dc 15, 2020 22:22

## 2020-06-15 NOTE — NUR
*-* INSURANCE *-*



DISCHARGE SUMMARY HAS BEEN FAXED



[facility]

REF# [id number]

F: [phone]

## 2020-06-30 NOTE — CODER PHYSICIAN QUERY
Clarification is required for compliance, coding accuracy, and to reflect 
severity of illness for this patient



Dear Dr. LAMBERT            Date: 06/30/20        /CDS Name: MINDYMADDIE





BRIEF HOSPITAL COURSE:

Patient is a 60-year-old male, who was just discharged from the hospital, was 
brought into ED from [facility] due to encephalopathy, confusion and 
agitation.





MEDICATIONS:  Include Xalatan, Wellbutrin, Os-David, Prezista, Truvada,

Keppra, Norvir, ibuprofen, Tylenol, bisacodyl, and hydrocodone.



LABORATORY DATA:  Hemoglobin and hematocrit 13/37, otherwise normal.  BMP

is normal.



ASSESSMENT:

1. Encephalopathy.

2. Confusion.

3. Agitation.

4. Anemia.

5. Chronic back pain.



FINAL DIAGNOSES: 

Encephalopathy





--------------------------------------------------------------------------------
----------------------------



Please respond to the following question: 



Is there a specific diagnosis for the underlying cause of the encephalopathy? 
If so please state below.



PHYSICIAN RESPONSE:







________________                                    _____________

MIRELLA LAMBERT D.O.                                    Date & TIME







Please also document in your Progress Notes and/or Discharge Summary and 
indicate if the condition was present on admission.

KYLE
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
